# Patient Record
Sex: MALE | Race: WHITE | NOT HISPANIC OR LATINO | Employment: STUDENT | RURAL
[De-identification: names, ages, dates, MRNs, and addresses within clinical notes are randomized per-mention and may not be internally consistent; named-entity substitution may affect disease eponyms.]

---

## 2022-12-07 NOTE — PROGRESS NOTES
[facility]   Consult  Evelio Joya  2000    VISIT DATE:  12/08/22    PCP:   Teena Olivarez, APRN  [address] [facility] [place] [address]        CC:  Loss of Consciousness, Chest Pain, Dizziness, and Rapid Heart Rate      Problem List:    History of Present Illness:  Evelio Joya  Is a 22 y.o. male with pertinent cardiac history detailed above.  Patient referred for syncope after prolonged standing.  Patient's sister has a pacemaker.  EKG showed sinus rhythm with rightward axis.    The first episode was in July. He fel forward and needed to get stitches in his chin.   He had a second episode in September.  The second episode he was able to lower down before falling.      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  Right now he is doing less strenuous labor.  The first time he had just started work, he had preceeding dizziness.    He has intermittent CP along the left side of his chest.   He will feel huis heart race at a times.    He has a sister that required pacemaker.  She is 19.  She was having frequent syncope.  No other cardiac family history that he is aware of.  He is otherwise healthy      There are no problems to display for this patient.      No Known Allergies    Social History     Socioeconomic History   • Marital status: Single   Substance and Sexual Activity   • Alcohol use: Defer   • Drug use: Not Currently     Types: Marijuana     Comment: does not currently smoke Marijauna now   • Sexual activity: Defer       History reviewed. No pertinent family history.    Current Medications:    Current Outpatient Medications:   •  Proventil  (90 Base) MCG/ACT inhaler, 1 puff As Needed., Disp: , Rfl:      Review of Systems   Cardiovascular: Positive for chest pain and syncope. Negative for dyspnea on exertion and irregular heartbeat.       Vitals:    12/08/22 1322 12/08/22 1334 12/08/22 1335   BP: 129/84 132/86 128/90   BP Location:  "Right arm Right arm Right arm   Patient Position: Sitting Sitting Standing   Pulse: 87 78 84   SpO2: 98% 99% 99%   Weight: 46.3 kg (102 lb)     Height: 160 cm (63\")         Physical Exam  Constitutional:       Appearance: Normal appearance.   Cardiovascular:      Rate and Rhythm: Normal rate and regular rhythm.      Pulses: Normal pulses.      Heart sounds: Normal heart sounds.   Pulmonary:      Effort: Pulmonary effort is normal.      Breath sounds: Normal breath sounds.   Musculoskeletal:      Right lower leg: No edema.      Left lower leg: No edema.   Skin:     General: Skin is warm and dry.   Neurological:      General: No focal deficit present.      Mental Status: He is alert.         Diagnostic Data:    ECG 12 Lead    Date/Time: 12/8/2022 1:43 PM  Performed by: James Marte MD  Authorized by: James Marte MD   Comparison: compared with previous ECG from 7/22/2022  Rhythm: sinus rhythm  Rate: normal  BPM: 79  QRS axis: right    Clinical impression: abnormal EKG          No results found for: CHLPL, TRIG, HDL, LDLDIRECT  No results found for: GLUCOSE, BUN, CREATININE, NA, K, CL, CO2, CREATININE, BUN  No results found for: HGBA1C  No results found for: WBC, HGB, HCT, PLT    Assessment:   Diagnosis Plan   1. Syncope and collapse  Adult Transthoracic Echo Complete W/ Cont if Necessary Per Protocol    Holter Monitor - 72 Hour Up To 15 Days          Plan:      1.  syncope  -EKG shows sinus rhythm with rightward axis, no other abnormalities  -Orthostatic blood pressures lying 129/84 with a pulse of 7, sitting 132/86 with a pulse of 78, standing 128/90 with a pulse of 84      -His history sounds most consistent with vasovagal episodes but given family history of his sister requiring pacemaker we should rule out structural disease with an echocardiogram and screen for arrhythmias with a 14-day Holter.  We will contact him after the above are complete.        James Marte MD FAC     "

## 2022-12-08 ENCOUNTER — OFFICE VISIT (OUTPATIENT)
Dept: CARDIOLOGY | Facility: CLINIC | Age: 22
End: 2022-12-08

## 2022-12-08 VITALS
BODY MASS INDEX: 18.07 KG/M2 | HEART RATE: 84 BPM | DIASTOLIC BLOOD PRESSURE: 90 MMHG | SYSTOLIC BLOOD PRESSURE: 128 MMHG | WEIGHT: 102 LBS | OXYGEN SATURATION: 99 % | HEIGHT: 63 IN

## 2022-12-08 DIAGNOSIS — R55 SYNCOPE AND COLLAPSE: Primary | ICD-10-CM

## 2022-12-08 PROCEDURE — 93000 ELECTROCARDIOGRAM COMPLETE: CPT | Performed by: INTERNAL MEDICINE

## 2022-12-08 PROCEDURE — 99203 OFFICE O/P NEW LOW 30 MIN: CPT | Performed by: INTERNAL MEDICINE

## 2022-12-08 RX ORDER — ALBUTEROL SULFATE 90 MCG
1 HFA AEROSOL WITH ADAPTER (GRAM) INHALATION AS NEEDED
COMMUNITY
Start: 2022-11-04

## 2022-12-28 ENCOUNTER — HOSPITAL ENCOUNTER (OUTPATIENT)
Dept: CARDIOLOGY | Facility: HOSPITAL | Age: 22
End: 2022-12-28

## 2023-01-11 ENCOUNTER — TELEPHONE (OUTPATIENT)
Dept: CARDIOLOGY | Facility: CLINIC | Age: 23
End: 2023-01-11
Payer: COMMERCIAL

## 2023-01-11 NOTE — TELEPHONE ENCOUNTER
----- Message from James Marte MD sent at 1/10/2023  5:23 PM EST -----  Heart monitor did not show any rhythms that should cause him to pass out.  1 episode of a slow heart rate overnight while he was asleep which we consider a benign finding.  We will update him again once his echo is complete

## 2023-01-16 NOTE — TELEPHONE ENCOUNTER
Called pt, no answer, LVM for return call.     Called pt's mom, Crystal. No answer, LVM to have pt call back,